# Patient Record
Sex: FEMALE | Race: ASIAN | NOT HISPANIC OR LATINO | Employment: OTHER | URBAN - METROPOLITAN AREA
[De-identification: names, ages, dates, MRNs, and addresses within clinical notes are randomized per-mention and may not be internally consistent; named-entity substitution may affect disease eponyms.]

---

## 2024-10-10 ENCOUNTER — OFFICE VISIT (OUTPATIENT)
Dept: INTERNAL MEDICINE | Age: 64
End: 2024-10-10
Payer: OTHER GOVERNMENT

## 2024-10-10 VITALS
BODY MASS INDEX: 22.11 KG/M2 | HEART RATE: 72 BPM | TEMPERATURE: 98.2 F | OXYGEN SATURATION: 100 % | WEIGHT: 124.8 LBS | DIASTOLIC BLOOD PRESSURE: 60 MMHG | SYSTOLIC BLOOD PRESSURE: 100 MMHG | HEIGHT: 63 IN

## 2024-10-10 DIAGNOSIS — E78.2 MIXED HYPERLIPIDEMIA: ICD-10-CM

## 2024-10-10 DIAGNOSIS — H53.9 VISION CHANGES: ICD-10-CM

## 2024-10-10 DIAGNOSIS — R73.01 IMPAIRED FASTING GLUCOSE: ICD-10-CM

## 2024-10-10 DIAGNOSIS — E03.4 HYPOTHYROIDISM DUE TO ACQUIRED ATROPHY OF THYROID: ICD-10-CM

## 2024-10-10 DIAGNOSIS — F51.02 ADJUSTMENT INSOMNIA: Primary | ICD-10-CM

## 2024-10-10 PROCEDURE — 99214 OFFICE O/P EST MOD 30 MIN: CPT | Performed by: INTERNAL MEDICINE

## 2024-10-10 RX ORDER — TRETINOIN 0.25 MG/G
1 CREAM TOPICAL NIGHTLY
Qty: 20 G | Refills: 3 | Status: SHIPPED | OUTPATIENT
Start: 2024-10-10

## 2024-10-10 RX ORDER — CARBOXYMETHYLCELLULOSE SODIUM 5 MG/ML
1 SOLUTION/ DROPS OPHTHALMIC 3 TIMES DAILY PRN
Qty: 30 EACH | Refills: 3 | Status: SHIPPED | OUTPATIENT
Start: 2024-10-10

## 2024-10-10 RX ORDER — TRAZODONE HYDROCHLORIDE 50 MG/1
TABLET, FILM COATED ORAL
Qty: 90 TABLET | Refills: 1 | Status: SHIPPED | OUTPATIENT
Start: 2024-10-10

## 2024-10-10 NOTE — ASSESSMENT & PLAN NOTE
Patient is using lorazepam half tablet intermittently, it would help her get to sleep, but she does not feel that she is getting good sleep, not getting into deep sleep.  Recommend we try low-dose trazodone at this time, discussed with her that she could still use the low-dose lorazepam intermittently if having problems falling asleep.  Patient to call if dose needs to be titrated.    Additionally patient having some issues with stress at work, we agreed to see where her thyroid is and if needs treatment we will hold off until its corrected in regards to reaching out to behavioral health.

## 2024-10-10 NOTE — ASSESSMENT & PLAN NOTE
Patient's A1c came back at 6.4.  She does have a family history of diabetes, her mom.  She has made some changes to her diet, but obviously is not in need of any weight loss etc.  Discussed with patient it may simply be related to the mild hypothyroidism but that repeat labs are needed presently to make further recommendations.

## 2024-10-10 NOTE — ASSESSMENT & PLAN NOTE
Patient's TSH came back at 5.7.  We are awaiting repeat as of her 10/24 OV to have more information in regards to treatment.  Discussed with patient it may have just been a transient issue, but could possibly explain the elevated sugars and elevated cholesterol.  May also be playing a role with her stress/coping skills.  Further recommendations after labs obtained.

## 2024-10-10 NOTE — ASSESSMENT & PLAN NOTE
Patient's LDL was in the 130 ballpark, this is fairly reasonable given her current health, however if the elevated A1c persist, then we would need to be more aggressive with her lipids.  Patient is aware that elevated lipids may simply be related to the thyroid, so if she ends up needing treatment for the hypothyroidism, we would hold off on treating the lipids until the thyroid is corrected.

## 2024-10-10 NOTE — PROGRESS NOTES
Chief Complaint  Follow-up (Pt states that this is routine, she had labs.) and Mental Health Problem (She is asking for referral to behavior health, she is not seeking medication, she is wanting to talk with some one. )    Subjective      Hyeonsuk Kim Lawter presents to [facility]    Insomnia  Pertinent negatives include no abdominal pain, arthralgias, chest pain, congestion, coughing, fatigue or fever.   Hyperlipidemia  Pertinent negatives include no chest pain or shortness of breath.   Anxiety   Symptoms include insomnia.  Patient reports no chest pain, confusion, depressed mood, palpitations or shortness of breath.   Follow-up  Conditions present:  Anxiety and Hyperlipidemia  Associated symptoms include: has insomnia. Pertinent negatives include no chest pain, no confusion, no palpitations, no shortness of breath, no fatigue, no blurred vision, no wheezing, no abdominal pain, no cough and no depressed mood.   Mental Health Problem    Additional symptoms of the illness include insomnia. Additional symptoms of the illness do not include appetite change, fatigue or abdominal pain.     History of present illness:  Patient is a 63-year-old female, no significant past medical history, no chronic meds, moved from [place] in 2021, who was seen in 6/24 as a New Patient, and he was coming back in 10/24 for initial laboratory follow-up etc.  There are no records available in [facility].  Care gaps are being addressed.    Review of Systems   Constitutional:  Negative for appetite change, fatigue and fever.   HENT:  Negative for congestion and ear pain.    Eyes:  Negative for blurred vision.   Respiratory:  Negative for cough, chest tightness, shortness of breath and wheezing.    Cardiovascular:  Negative for chest pain, palpitations and leg swelling.   Gastrointestinal:  Negative for abdominal pain.   Genitourinary:  Negative for difficulty urinating, dysuria and hematuria.   Musculoskeletal:   "Negative for arthralgias and gait problem.   Skin:  Negative for skin lesions.   Neurological:  Negative for syncope, memory problem and confusion.   Psychiatric/Behavioral:  Negative for self-injury and depressed mood. The patient has insomnia.        Objective   Vital Signs:   /60   Pulse 72   Temp 98.2 °F (36.8 °C) (Skin)   Ht 160 cm (62.99\")   Wt 56.6 kg (124 lb 12.8 oz)   SpO2 100%   BMI 22.11 kg/m²         Physical Exam  Vitals and nursing note reviewed.   Constitutional:       General: She is not in acute distress.     Appearance: Normal appearance. She is not toxic-appearing.   HENT:      Head: Atraumatic.      Right Ear: External ear normal.      Left Ear: External ear normal.      Nose: Nose normal.      Mouth/Throat:      Mouth: Mucous membranes are moist.   Eyes:      General:         Right eye: No discharge.         Left eye: No discharge.      Extraocular Movements: Extraocular movements intact.      Pupils: Pupils are equal, round, and reactive to light.   Cardiovascular:      Rate and Rhythm: Normal rate and regular rhythm.      Pulses: Normal pulses.      Heart sounds: Normal heart sounds. No murmur heard.     No gallop.   Pulmonary:      Effort: Pulmonary effort is normal. No respiratory distress.      Breath sounds: No wheezing, rhonchi or rales.   Abdominal:      General: There is no distension.      Palpations: Abdomen is soft. There is no mass.      Tenderness: There is no abdominal tenderness. There is no guarding.   Musculoskeletal:         General: No swelling or tenderness.      Cervical back: No tenderness.      Right lower leg: No edema.      Left lower leg: No edema.   Skin:     General: Skin is warm and dry.      Findings: No rash.   Neurological:      General: No focal deficit present.      Mental Status: She is alert and oriented to person, place, and time. Mental status is at baseline.      Motor: No weakness.      Gait: Gait normal.   Psychiatric:         Mood and " Affect: Mood normal.         Thought Content: Thought content normal.          Result Review   The following data was reviewed by: Demarcus Gaming MD on 06/28/2024:             Assessment and Plan   Diagnoses and all orders for this visit:    1. Adjustment insomnia (Primary)  Assessment & Plan:  Patient is using lorazepam half tablet intermittently, it would help her get to sleep, but she does not feel that she is getting good sleep, not getting into deep sleep.  Recommend we try low-dose trazodone at this time, discussed with her that she could still use the low-dose lorazepam intermittently if having problems falling asleep.  Patient to call if dose needs to be titrated.    Additionally patient having some issues with stress at work, we agreed to see where her thyroid is and if needs treatment we will hold off until its corrected in regards to reaching out to behavioral health.      2. Mixed hyperlipidemia  Assessment & Plan:  Patient's LDL was in the 130 ballpark, this is fairly reasonable given her current health, however if the elevated A1c persist, then we would need to be more aggressive with her lipids.  Patient is aware that elevated lipids may simply be related to the thyroid, so if she ends up needing treatment for the hypothyroidism, we would hold off on treating the lipids until the thyroid is corrected.      3. Impaired fasting glucose  Assessment & Plan:  Patient's A1c came back at 6.4.  She does have a family history of diabetes, her mom.  She has made some changes to her diet, but obviously is not in need of any weight loss etc.  Discussed with patient it may simply be related to the mild hypothyroidism but that repeat labs are needed presently to make further recommendations.      4. Hypothyroidism due to acquired atrophy of thyroid  Assessment & Plan:  Patient's TSH came back at 5.7.  We are awaiting repeat as of her 10/24 OV to have more information in regards to treatment.  Discussed with patient  it may have just been a transient issue, but could possibly explain the elevated sugars and elevated cholesterol.  May also be playing a role with her stress/coping skills.  Further recommendations after labs obtained.      Other orders  -     carboxymethylcellulose (Refresh Plus) 0.5 % solution; Administer 1 drop to both eyes 3 (Three) Times a Day As Needed for Dry Eyes.  Dispense: 30 each; Refill: 3  -     tretinoin (Retin-A) 0.025 % cream; Apply 1 Application topically to the appropriate area as directed Every Night.  Dispense: 20 g; Refill: 3  -     traZODone (DESYREL) 50 MG tablet; Take 1/2 tablet 90 minutes prior to sleep, may increase to whole tablet if needed.  Dispense: 90 tablet; Refill: 1          BMI is within normal parameters. No other follow-up for BMI required.      Follow Up   Return in about 4 months (around 2/10/2025).  Patient was given instructions and counseling regarding her condition or for health maintenance advice. Please see specific information pulled into the AVS if appropriate.     Total Time Spent:   minutes     This time includes time spent by me in the following activities: preparing for the visit, reviewing extensive past medical history and tests, performing a medically appropriate examination and/or evaluation, counseling and educating the patient and/or caregivers, ordering medications, tests, or procedures, referring and/or communicating with other health care professionals and documenting information in the medical record all on this date of service.

## 2025-02-17 ENCOUNTER — LAB (OUTPATIENT)
Dept: LAB | Facility: HOSPITAL | Age: 65
End: 2025-02-17
Payer: OTHER GOVERNMENT

## 2025-02-17 DIAGNOSIS — R79.89 ELEVATED LFTS: ICD-10-CM

## 2025-02-17 DIAGNOSIS — R73.01 IMPAIRED FASTING GLUCOSE: ICD-10-CM

## 2025-02-17 DIAGNOSIS — E78.2 MIXED HYPERLIPIDEMIA: ICD-10-CM

## 2025-02-17 DIAGNOSIS — E03.8 SUBCLINICAL HYPOTHYROIDISM: ICD-10-CM

## 2025-02-17 LAB
ALBUMIN SERPL-MCNC: 3.9 G/DL (ref 3.5–5.2)
ALBUMIN/GLOB SERPL: 1.1 G/DL
ALP SERPL-CCNC: 95 U/L (ref 39–117)
ALT SERPL W P-5'-P-CCNC: 33 U/L (ref 1–33)
ANION GAP SERPL CALCULATED.3IONS-SCNC: 7.4 MMOL/L (ref 5–15)
AST SERPL-CCNC: 38 U/L (ref 1–32)
BILIRUB SERPL-MCNC: 0.5 MG/DL (ref 0–1.2)
BUN SERPL-MCNC: 9 MG/DL (ref 8–23)
BUN/CREAT SERPL: 12.3 (ref 7–25)
CALCIUM SPEC-SCNC: 9.6 MG/DL (ref 8.6–10.5)
CHLORIDE SERPL-SCNC: 102 MMOL/L (ref 98–107)
CHOLEST SERPL-MCNC: 213 MG/DL (ref 0–200)
CK SERPL-CCNC: 154 U/L (ref 20–180)
CO2 SERPL-SCNC: 29.6 MMOL/L (ref 22–29)
CREAT SERPL-MCNC: 0.73 MG/DL (ref 0.57–1)
EGFRCR SERPLBLD CKD-EPI 2021: 92 ML/MIN/1.73
GGT SERPL-CCNC: 72 U/L (ref 5–36)
GLOBULIN UR ELPH-MCNC: 3.6 GM/DL
GLUCOSE SERPL-MCNC: 124 MG/DL (ref 65–99)
HBA1C MFR BLD: 6.5 % (ref 4.8–5.6)
HDLC SERPL-MCNC: 68 MG/DL (ref 40–60)
LDLC SERPL CALC-MCNC: 122 MG/DL (ref 0–100)
LDLC/HDLC SERPL: 1.75 {RATIO}
POTASSIUM SERPL-SCNC: 4 MMOL/L (ref 3.5–5.2)
PROT SERPL-MCNC: 7.5 G/DL (ref 6–8.5)
SODIUM SERPL-SCNC: 139 MMOL/L (ref 136–145)
T4 FREE SERPL-MCNC: 1.24 NG/DL (ref 0.92–1.68)
TRIGL SERPL-MCNC: 130 MG/DL (ref 0–150)
TSH SERPL DL<=0.05 MIU/L-ACNC: 3.12 UIU/ML (ref 0.27–4.2)
VLDLC SERPL-MCNC: 23 MG/DL (ref 5–40)

## 2025-02-17 PROCEDURE — 80061 LIPID PANEL: CPT

## 2025-02-17 PROCEDURE — 80053 COMPREHEN METABOLIC PANEL: CPT

## 2025-02-17 PROCEDURE — 36415 COLL VENOUS BLD VENIPUNCTURE: CPT

## 2025-02-17 PROCEDURE — 82550 ASSAY OF CK (CPK): CPT

## 2025-02-17 PROCEDURE — 84443 ASSAY THYROID STIM HORMONE: CPT

## 2025-02-17 PROCEDURE — 84439 ASSAY OF FREE THYROXINE: CPT

## 2025-02-17 PROCEDURE — 83036 HEMOGLOBIN GLYCOSYLATED A1C: CPT

## 2025-02-17 PROCEDURE — 82977 ASSAY OF GGT: CPT

## 2025-02-25 ENCOUNTER — OFFICE VISIT (OUTPATIENT)
Age: 65
End: 2025-02-25
Payer: OTHER GOVERNMENT

## 2025-02-25 VITALS
SYSTOLIC BLOOD PRESSURE: 103 MMHG | HEIGHT: 62 IN | WEIGHT: 125.8 LBS | BODY MASS INDEX: 23.15 KG/M2 | DIASTOLIC BLOOD PRESSURE: 63 MMHG | HEART RATE: 69 BPM

## 2025-02-25 DIAGNOSIS — R79.89 ELEVATED LFTS: ICD-10-CM

## 2025-02-25 DIAGNOSIS — E03.4 HYPOTHYROIDISM DUE TO ACQUIRED ATROPHY OF THYROID: ICD-10-CM

## 2025-02-25 DIAGNOSIS — R73.01 IMPAIRED FASTING GLUCOSE: ICD-10-CM

## 2025-02-25 DIAGNOSIS — E78.2 MIXED HYPERLIPIDEMIA: Primary | ICD-10-CM

## 2025-02-25 DIAGNOSIS — Z00.00 WELL ADULT HEALTH CHECK: ICD-10-CM

## 2025-02-25 DIAGNOSIS — F51.02 ADJUSTMENT INSOMNIA: ICD-10-CM

## 2025-02-25 PROCEDURE — 99214 OFFICE O/P EST MOD 30 MIN: CPT | Performed by: INTERNAL MEDICINE

## 2025-02-25 RX ORDER — TRETINOIN 0.25 MG/G
1 CREAM TOPICAL NIGHTLY
Qty: 20 G | Refills: 3 | Status: SHIPPED | OUTPATIENT
Start: 2025-02-25

## 2025-02-25 NOTE — ASSESSMENT & PLAN NOTE
Patient is A1c is stable at 6.5 as of 2/25 OV.  She is on no meds, none indicated at this time.  She is working on backing off on the carbohydrates, discussed with patient particularly watch the simple sugars, otherwise just recommend repeat in about 6 months.

## 2025-02-25 NOTE — ASSESSMENT & PLAN NOTE
These were confirmed with a mildly elevated GGT, but are improved and almost normal as of her 2/25 OV.  Hep B and C were already negative, we will just repeat this with routine labs in 6 months.

## 2025-02-25 NOTE — PROGRESS NOTES
Chief Complaint  Follow-up (Pt doesn't want the lorazepam anymore )    Subjective      Hyeonsuk Kim Lawter presents to [facility]    Insomnia  Pertinent negatives include no abdominal pain, arthralgias, chest pain, congestion, coughing, fatigue, fever or headaches.   Hyperlipidemia  Pertinent negatives include no chest pain or shortness of breath.   Anxiety   Symptoms include insomnia.  Patient reports no chest pain, confusion, depressed mood, palpitations or shortness of breath.   Primary Care Follow-Up  Conditions present:  Anxiety and Hyperlipidemia  Associated symptoms include: has insomnia. Pertinent negatives include no chest pain, no confusion, no palpitations, no shortness of breath, no fatigue, no weight loss, no blurred vision, no foot ulcerations, no polydipsia, no polyuria, no headaches, no wheezing, no abdominal pain, no cough, no depressed mood and no tremors.   Mental Health Problem    Additional symptoms of the illness include insomnia. Additional symptoms of the illness do not include appetite change, fatigue, headaches or abdominal pain.   Diabetes  She has type 2 diabetes mellitus. No MedicAlert identification noted. The initial diagnosis of diabetes was made 6 months ago. Pertinent negatives for hypoglycemia include no confusion, headaches, speech difficulty or tremors. Pertinent negatives for diabetes include no blurred vision, no chest pain, no fatigue, no foot paresthesias, no foot ulcerations, no polydipsia, no polyuria and no weight loss. Pertinent negatives for hypoglycemia complications include no blackouts, no hospitalization, no nocturnal hypoglycemia, no required assistance and no required glucagon injection. Symptoms are stable. She is compliant with treatment most of the time. She is following a generally healthy diet. Meal planning includes avoidance of concentrated sweets. She participates in exercise three times a week. She does not see a  "podiatrist. Eye exam is not current.     History of present illness:  Patient is a 63-year-old female, no significant past medical history, no chronic meds, moved from [place] in 2021, who was seen in 6/24 as a New Patient, and who is coming back now in 2/25 for 4-month follow-up.  There are no records available in [facility].  Care gaps are being addressed.    Review of Systems   Constitutional:  Negative for appetite change, fatigue, fever and unexpected weight loss.   HENT:  Negative for congestion and ear pain.    Eyes:  Negative for blurred vision.   Respiratory:  Negative for cough, chest tightness, shortness of breath and wheezing.    Cardiovascular:  Negative for chest pain, palpitations and leg swelling.   Gastrointestinal:  Negative for abdominal pain.   Endocrine: Negative for polydipsia and polyuria.   Genitourinary:  Negative for difficulty urinating, dysuria and hematuria.   Musculoskeletal:  Negative for arthralgias and gait problem.   Skin:  Negative for skin lesions.   Neurological:  Negative for tremors, syncope, speech difficulty, memory problem and confusion.   Psychiatric/Behavioral:  Negative for self-injury and depressed mood. The patient has insomnia.        Objective   Vital Signs:   /63   Pulse 69   Ht 157.5 cm (62\")   Wt 57.1 kg (125 lb 12.8 oz)   BMI 23.01 kg/m²         Physical Exam  Vitals and nursing note reviewed.   Constitutional:       General: She is not in acute distress.     Appearance: Normal appearance. She is not toxic-appearing.   HENT:      Head: Atraumatic.      Right Ear: External ear normal.      Left Ear: External ear normal.      Nose: Nose normal.      Mouth/Throat:      Mouth: Mucous membranes are moist.   Eyes:      General:         Right eye: No discharge.         Left eye: No discharge.      Extraocular Movements: Extraocular movements intact.      Pupils: Pupils are equal, round, and reactive to light.   Cardiovascular:      Rate and Rhythm: Normal rate and " regular rhythm.      Pulses: Normal pulses.      Heart sounds: Normal heart sounds. No murmur heard.     No gallop.   Pulmonary:      Effort: Pulmonary effort is normal. No respiratory distress.      Breath sounds: No wheezing, rhonchi or rales.   Abdominal:      General: There is no distension.      Palpations: Abdomen is soft. There is no mass.      Tenderness: There is no abdominal tenderness. There is no guarding.   Musculoskeletal:         General: No swelling or tenderness.      Cervical back: No tenderness.      Right lower leg: No edema.      Left lower leg: No edema.   Skin:     General: Skin is warm and dry.      Findings: No rash.   Neurological:      General: No focal deficit present.      Mental Status: She is alert and oriented to person, place, and time. Mental status is at baseline.      Motor: No weakness.      Gait: Gait normal.   Psychiatric:         Mood and Affect: Mood normal.         Thought Content: Thought content normal.          Result Review   The following data was reviewed by: Demarcus Gaming MD on 06/28/2024:             Assessment and Plan   Diagnoses and all orders for this visit:    1. Mixed hyperlipidemia (Primary)  Assessment & Plan:  Patient's LDL is stable in the 120s as of her 2/25 OV.  Certainly do not need to treat at this level unless her A1c continues to climb on us.  Will check again in 6 months.    Orders:  -     Lipid Panel; Future    2. Impaired fasting glucose  Overview:  Family history of diabetes = mom around age 68.    Assessment & Plan:  Patient is A1c is stable at 6.5 as of 2/25 OV.  She is on no meds, none indicated at this time.  She is working on backing off on the carbohydrates, discussed with patient particularly watch the simple sugars, otherwise just recommend repeat in about 6 months.    Orders:  -     Hemoglobin A1c; Future    3. Hypothyroidism due to acquired atrophy of thyroid  Assessment & Plan:  Patient's TSH is down from 5.7 to 3.1 as of her 2/25 OV.   "This was without any location, certainly none indicated, will follow this up in 6 months with her A1c.    Orders:  -     TSH; Future    4. Elevated LFTs  Assessment & Plan:  These were confirmed with a mildly elevated GGT, but are improved and almost normal as of her 2/25 OV.  Hep B and C were already negative, we will just repeat this with routine labs in 6 months.    Orders:  -     Comprehensive Metabolic Panel; Future    5. Well adult health check  -     CBC & Differential; Future    6. Adjustment insomnia  Assessment & Plan:  Patient utilize the low-dose trazodone initially, then got into an online CBT therapy program, benefiting much more with this, feels more level \"like myself\", she is no longer needing the trazodone and basically did not use much of the Ativan either.    Certainly no new treatment recommended this regards.      Other orders  -     tretinoin (Retin-A) 0.025 % cream; Apply 1 Application topically to the appropriate area as directed Every Night.  Dispense: 20 g; Refill: 3            BMI is within normal parameters. No other follow-up for BMI required.      Follow Up   No follow-ups on file.  Patient was given instructions and counseling regarding her condition or for health maintenance advice. Please see specific information pulled into the AVS if appropriate.     Total Time Spent:   minutes     This time includes time spent by me in the following activities: preparing for the visit, reviewing extensive past medical history and tests, performing a medically appropriate examination and/or evaluation, counseling and educating the patient and/or caregivers, ordering medications, tests, or procedures, referring and/or communicating with other health care professionals and documenting information in the medical record all on this date of service.     "

## 2025-02-25 NOTE — ASSESSMENT & PLAN NOTE
Patient's TSH is down from 5.7 to 3.1 as of her 2/25 OV.  This was without any location, certainly none indicated, will follow this up in 6 months with her A1c.

## 2025-02-25 NOTE — ASSESSMENT & PLAN NOTE
Patient's LDL is stable in the 120s as of her 2/25 OV.  Certainly do not need to treat at this level unless her A1c continues to climb on us.  Will check again in 6 months.

## 2025-02-25 NOTE — ASSESSMENT & PLAN NOTE
"Patient utilize the low-dose trazodone initially, then got into an online CBT therapy program, benefiting much more with this, feels more level \"like myself\", she is no longer needing the trazodone and basically did not use much of the Ativan either.    Certainly no new treatment recommended this regards.  "

## 2025-08-25 ENCOUNTER — OFFICE VISIT (OUTPATIENT)
Age: 65
End: 2025-08-25
Payer: MEDICARE

## 2025-08-25 VITALS
WEIGHT: 125.6 LBS | BODY MASS INDEX: 23.11 KG/M2 | HEIGHT: 62 IN | HEART RATE: 73 BPM | OXYGEN SATURATION: 98 % | DIASTOLIC BLOOD PRESSURE: 64 MMHG | SYSTOLIC BLOOD PRESSURE: 102 MMHG

## 2025-08-25 DIAGNOSIS — Z00.00 WELCOME TO MEDICARE PREVENTIVE VISIT: Primary | ICD-10-CM

## 2025-08-25 DIAGNOSIS — E78.2 MIXED HYPERLIPIDEMIA: ICD-10-CM

## 2025-08-25 DIAGNOSIS — F41.9 ANXIETY DISORDER, UNSPECIFIED TYPE: ICD-10-CM

## 2025-08-25 DIAGNOSIS — E03.4 HYPOTHYROIDISM DUE TO ACQUIRED ATROPHY OF THYROID: ICD-10-CM

## 2025-08-25 DIAGNOSIS — R73.01 IMPAIRED FASTING GLUCOSE: ICD-10-CM

## 2025-08-25 DIAGNOSIS — F51.02 ADJUSTMENT INSOMNIA: ICD-10-CM

## 2025-08-25 DIAGNOSIS — Z78.0 POSTMENOPAUSE: ICD-10-CM

## 2025-08-25 DIAGNOSIS — E55.9 VITAMIN D DEFICIENCY: ICD-10-CM

## 2025-08-25 PROCEDURE — 1160F RVW MEDS BY RX/DR IN RCRD: CPT | Performed by: INTERNAL MEDICINE

## 2025-08-25 PROCEDURE — 1159F MED LIST DOCD IN RCRD: CPT | Performed by: INTERNAL MEDICINE

## 2025-08-25 PROCEDURE — 1126F AMNT PAIN NOTED NONE PRSNT: CPT | Performed by: INTERNAL MEDICINE

## 2025-08-25 PROCEDURE — 1170F FXNL STATUS ASSESSED: CPT | Performed by: INTERNAL MEDICINE

## 2025-08-25 PROCEDURE — 99214 OFFICE O/P EST MOD 30 MIN: CPT | Performed by: INTERNAL MEDICINE

## 2025-08-25 PROCEDURE — G0402 INITIAL PREVENTIVE EXAM: HCPCS | Performed by: INTERNAL MEDICINE

## 2025-08-25 PROCEDURE — G0403 EKG FOR INITIAL PREVENT EXAM: HCPCS | Performed by: INTERNAL MEDICINE

## 2025-08-25 RX ORDER — TRETINOIN 0.25 MG/G
1 CREAM TOPICAL NIGHTLY
Qty: 20 G | Refills: 3 | Status: SHIPPED | OUTPATIENT
Start: 2025-08-25

## 2025-08-25 RX ORDER — LORAZEPAM 0.5 MG/1
TABLET ORAL
Qty: 30 TABLET | Refills: 0 | Status: SHIPPED | OUTPATIENT
Start: 2025-08-25

## 2025-08-25 RX ORDER — CARBOXYMETHYLCELLULOSE SODIUM 5 MG/ML
1 SOLUTION/ DROPS OPHTHALMIC 3 TIMES DAILY PRN
Qty: 60 EACH | Refills: 3 | Status: SHIPPED | OUTPATIENT
Start: 2025-08-25